# Patient Record
Sex: MALE | Race: WHITE | NOT HISPANIC OR LATINO
[De-identification: names, ages, dates, MRNs, and addresses within clinical notes are randomized per-mention and may not be internally consistent; named-entity substitution may affect disease eponyms.]

---

## 2018-12-18 PROBLEM — Z00.00 ENCOUNTER FOR PREVENTIVE HEALTH EXAMINATION: Status: ACTIVE | Noted: 2018-12-18

## 2018-12-19 ENCOUNTER — APPOINTMENT (OUTPATIENT)
Dept: NEUROSURGERY | Facility: CLINIC | Age: 28
End: 2018-12-19
Payer: COMMERCIAL

## 2018-12-19 VITALS
WEIGHT: 205 LBS | BODY MASS INDEX: 27.17 KG/M2 | DIASTOLIC BLOOD PRESSURE: 85 MMHG | HEIGHT: 73 IN | RESPIRATION RATE: 17 BRPM | SYSTOLIC BLOOD PRESSURE: 135 MMHG | HEART RATE: 92 BPM

## 2018-12-19 DIAGNOSIS — Z78.9 OTHER SPECIFIED HEALTH STATUS: ICD-10-CM

## 2018-12-19 DIAGNOSIS — H57.11 OCULAR PAIN, RIGHT EYE: ICD-10-CM

## 2018-12-19 PROCEDURE — 99203 OFFICE O/P NEW LOW 30 MIN: CPT

## 2018-12-29 PROBLEM — Z78.9 NON-SMOKER: Status: ACTIVE | Noted: 2018-12-19

## 2018-12-29 PROBLEM — Z78.9 SOCIAL ALCOHOL USE: Status: ACTIVE | Noted: 2018-12-19

## 2018-12-29 PROBLEM — Z78.9 DOES NOT USE ILLICIT DRUGS: Status: ACTIVE | Noted: 2018-12-19

## 2019-01-09 ENCOUNTER — APPOINTMENT (OUTPATIENT)
Dept: NEUROSURGERY | Facility: CLINIC | Age: 29
End: 2019-01-09
Payer: COMMERCIAL

## 2019-01-09 VITALS
HEIGHT: 73 IN | RESPIRATION RATE: 16 BRPM | OXYGEN SATURATION: 98 % | HEART RATE: 89 BPM | DIASTOLIC BLOOD PRESSURE: 79 MMHG | WEIGHT: 218 LBS | SYSTOLIC BLOOD PRESSURE: 121 MMHG | BODY MASS INDEX: 28.89 KG/M2 | TEMPERATURE: 97.8 F

## 2019-01-09 DIAGNOSIS — D18.09 HEMANGIOMA OF OTHER SITES: ICD-10-CM

## 2019-01-09 DIAGNOSIS — M25.511 PAIN IN RIGHT SHOULDER: ICD-10-CM

## 2019-01-09 DIAGNOSIS — H05.89 OTHER DISORDERS OF ORBIT: ICD-10-CM

## 2019-01-09 PROCEDURE — 99213 OFFICE O/P EST LOW 20 MIN: CPT

## 2019-01-09 NOTE — REVIEW OF SYSTEMS
[Feeling Poorly] : feeling poorly [Feeling Tired] : feeling tired [Recent Weight Gain (___ Lbs)] : recent [unfilled] ~Ulb weight gain [Sleep Disturbances] : sleep disturbances [Anxiety] : anxiety [Eye Pain] : eye pain [As Noted in HPI] : as noted in HPI [Nosebleeds] : nosebleeds [Negative] : Heme/Lymph

## 2019-01-09 NOTE — REASON FOR VISIT
[Follow-Up: _____] : a [unfilled] follow-up visit [FreeTextEntry1] : Pt is here today for a follow up visit with results from eye exam. Pt was unable to see an ENT as referred to due to financial hardship. Pt has gained weight stress eating. Pt states he still fells as if something is in his eye constantly. He is still having nose bleeds.

## 2019-01-09 NOTE — CONSULT LETTER
[Sincerely,] : Sincerely, [Dear  ___] : Dear ~DARIAN, [FreeTextEntry1] : I have seen the patient Faustino Steel in my office for further evaluation of his recently identified right orbital mass. As you may recall the patient presented approximately one month ago to an outside hospital with intense right thigh pain and headaches. Imaging of the brain and orbit were performed identifying a discrete lesion in the conus region superior and lateral to the optic nerve with some impact on the nerve. The patient has persistent irritation feeling and proptosis of the right eye. It is 3 weeks since we saw the patient previously and he is now undergone off ophthalmologic evaluation. The patient reports no change in his discomfort. The ophthalmology report does not identify any damage to these optic nerve. Formal visual field tests are normal.\par \par Since I saw the patient previously and reviewed his outside images with her neuroradiology team in detail. 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 \par \par There is no change in the patient's neurologic examination.\par \par I discussed in detail with the patient and natural history and pathophysiology of a cavernous hemangioma in general. We have reviewed the potential treatments required for a cavernous hemangioma affecting the orbit.  Usually no treatment is required. However, documented with persistent pain and symptoms would justify surgical resection. No other adjunctive therapies such as radiation or medication would have any impact in this neurology. I will make inquiries with a several surgeons to perform excision of lesions within the orbit to determine next best course of action. The patient has indicated he would like to move her surgery if possible because of his daily persistent pain.\par \par In passing the patient has also been having difficulties with nosebleeds. He has not yet been seen by ENT specialist. The patient has been using methods to hydrate the nostrils with some benefit. He has not had nosebleeds over 2 weeks now. In addition, a referral has been made to orthopedic surgery for evaluation of the right shoulder. The patient has clear evidence of restricted range of motion and pain with mobilization. There is also pain anterior and superior to the capsule of the shoulder with palpation. This is consistent with either significant bursitis with possible rotator cuff tear given the patient's job description.\par \par Thank you for very kindly including me in the evaluation and treatment of your patient. Please do not hesitate to contact me should any questions or concerns regarding this evaluation, the patient's diagnosis of an orbital lesion most consistent with a cavernous hemangioma or, his ongoing followup a care plan. [FreeTextEntry3] : Derrek Snyder MD, PhD, FRCPSC\par  of Neurosurgery\par [facility]\par  of Neurosurgery\par [facility] \par [address] [place] [address]\[place] [address]\par Tel: [phone]\par FAX: [phone]\par Email: [email or web address] \par [facility]\par Visit us at [email or web address] <[email or web address]\[facility] [place] \[facility] [place]